# Patient Record
Sex: FEMALE | Race: WHITE | NOT HISPANIC OR LATINO | URBAN - METROPOLITAN AREA
[De-identification: names, ages, dates, MRNs, and addresses within clinical notes are randomized per-mention and may not be internally consistent; named-entity substitution may affect disease eponyms.]

---

## 2017-01-21 ENCOUNTER — TRANSFERRED RECORDS (OUTPATIENT)
Dept: HEALTH INFORMATION MANAGEMENT | Facility: CLINIC | Age: 82
End: 2017-01-21

## 2017-01-23 ENCOUNTER — HISTORIC RESULTS (OUTPATIENT)
Dept: ADMINISTRATIVE | Age: 82
End: 2017-01-23

## 2017-01-30 ENCOUNTER — HISTORIC RESULTS (OUTPATIENT)
Dept: ADMINISTRATIVE | Age: 82
End: 2017-01-30

## 2017-06-01 ENCOUNTER — TRANSFERRED RECORDS (OUTPATIENT)
Dept: HEALTH INFORMATION MANAGEMENT | Facility: CLINIC | Age: 82
End: 2017-06-01

## 2017-07-11 ENCOUNTER — OFFICE VISIT (OUTPATIENT)
Dept: OPHTHALMOLOGY | Facility: CLINIC | Age: 82
End: 2017-07-11
Attending: OPHTHALMOLOGY
Payer: MEDICARE

## 2017-07-11 DIAGNOSIS — H53.40 VISUAL FIELD DEFECT: Primary | ICD-10-CM

## 2017-07-11 PROCEDURE — 99214 OFFICE O/P EST MOD 30 MIN: CPT | Mod: ZF | Performed by: TECHNICIAN/TECHNOLOGIST

## 2017-07-11 RX ORDER — ACETAMINOPHEN 325 MG/1
325-650 TABLET ORAL EVERY 6 HOURS PRN
COMMUNITY

## 2017-07-11 RX ORDER — LEVETIRACETAM 500 MG/1
500 TABLET ORAL 2 TIMES DAILY
COMMUNITY

## 2017-07-11 RX ORDER — LEVOTHYROXINE SODIUM 100 UG/1
100 TABLET ORAL DAILY
COMMUNITY

## 2017-07-11 RX ORDER — SIMVASTATIN 20 MG
TABLET ORAL AT BEDTIME
COMMUNITY

## 2017-07-11 RX ORDER — MULTIVIT-MIN/IRON/FOLIC ACID/K 18-600-40
CAPSULE ORAL
COMMUNITY

## 2017-07-11 RX ORDER — ESCITALOPRAM OXALATE 5 MG/1
5 TABLET ORAL DAILY
COMMUNITY

## 2017-07-11 RX ORDER — METOPROLOL TARTRATE 50 MG
50 TABLET ORAL 2 TIMES DAILY
COMMUNITY

## 2017-07-11 ASSESSMENT — REFRACTION_WEARINGRX
OS_CYLINDER: SPHERE
OD_ADD: +3.00
OS_SPHERE: -0.50
OD_SPHERE: -0.75
OD_AXIS: 040
OS_ADD: +3.00
OD_CYLINDER: +0.25

## 2017-07-11 ASSESSMENT — VISUAL ACUITY
OD_CC: 20/25
OS_CC: 20/30
METHOD: SNELLEN - LINEAR
OS_CC: J1
OD_CC: J1
OS_CC+: +2
CORRECTION_TYPE: GLASSES
OD_CC+: +2

## 2017-07-11 ASSESSMENT — CUP TO DISC RATIO
OS_RATIO: 0.3
OD_RATIO: 0.25

## 2017-07-11 ASSESSMENT — CONF VISUAL FIELD
OD_INFERIOR_NASAL_RESTRICTION: 3
METHOD: COUNTING FINGERS
OD_SUPERIOR_NASAL_RESTRICTION: 3
OS_INFERIOR_TEMPORAL_RESTRICTION: 3
OS_SUPERIOR_TEMPORAL_RESTRICTION: 3

## 2017-07-11 ASSESSMENT — TONOMETRY
IOP_METHOD: ICARE
OD_IOP_MMHG: 14
OS_IOP_MMHG: 14

## 2017-07-11 ASSESSMENT — EXTERNAL EXAM - LEFT EYE: OS_EXAM: NORMAL

## 2017-07-11 ASSESSMENT — SLIT LAMP EXAM - LIDS
COMMENTS: NORMAL
COMMENTS: NORMAL

## 2017-07-11 ASSESSMENT — EXTERNAL EXAM - RIGHT EYE: OD_EXAM: NORMAL

## 2017-07-11 NOTE — MR AVS SNAPSHOT
After Visit Summary   2017    Eusebia Montenegro    MRN: [id number]           Patient Information     Date Of Birth          1935        Visit Information        Provider Department      2017 1:00 PM Xiang Davies MD [facility] Peds Eye General        Today's Diagnoses     Visual field defect    -  1       Follow-ups after your visit        Who to contact     Please call your clinic at [phone] to:    Ask questions about your health    Make or cancel appointments    Discuss your medicines    Learn about your test results    Speak to your doctor   If you have compliments or concerns about an experience at your clinic, or if you wish to file a complaint, please contact [facility] Patient Relations at [phone] or email us at [email or web address]         Additional Information About Your Visit        MyChart Information     [employer] is an electronic gateway that provides easy, online access to your medical records. With [employer], you can request a clinic appointment, read your test results, renew a prescription or communicate with your care team.     To sign up for [employer] visit the website at [email or web address]   You will be asked to enter the access code listed below, as well as some personal information. Please follow the directions to create your username and password.     Your access code is: [email or web address]  Expires: 10/17/2017  8:27 PM     Your access code will  in 90 days. If you need help or a new code, please contact your [facility] or call [phone] for assistance.        Care EveryWhere ID     This is your Care EveryWhere ID. This could be used by other organizations to access your [facility] medical records  [id number]         Blood Pressure from Last 3 Encounters:   No data found for BP    Weight from Last 3 Encounters:   No data found for Wt              Today, you had the following      No orders found for display       Primary Care Provider Office Phone # Fax #    Josephine Gonzales [phone]       [facility] [address]  [facility] [address]        Equal Access to Services     YARELY BOLES : Hadii srinath ku hadanujo Soomaali, waaxda luqadaha, qaybta kaalmada adeegyada, kartik rojasn breann starr laLorenethor plaza. So [facility] [phone].    ATENCIÓN: Si habla español, tiene a overton disposición servicios gratuitos de asistencia lingüística. Llame al [phone].    We comply with applicable federal civil rights laws and [place] laws. We do not discriminate on the basis of race, color, national origin, age, disability sex, sexual orientation or gender identity.            Thank you!     Thank you for choosing [facility]  for your care. Our goal is always to provide you with excellent care. Hearing back from our patients is one way we can continue to improve our services. Please take a few minutes to complete the written survey that you may receive in the mail after your visit with us. Thank you!             Your Updated Medication List - Protect others around you: Learn how to safely use, store and throw away your medicines at www.disposemymeds.org.          This list is accurate as of: 7/11/17 11:59 PM.  Always use your most recent med list.                   Brand Name Dispense Instructions for use Diagnosis    acetaminophen 325 MG tablet    TYLENOL     Take 325-650 mg by mouth every 6 hours as needed        Calcium 250 MG Caps           escitalopram 5 MG tablet    LEXAPRO     Take 5 mg by mouth daily        levETIRAcetam 500 MG tablet    KEPPRA     Take 500 mg by mouth 2 times daily        levothyroxine 100 MCG tablet    SYNTHROID/LEVOTHROID     Take 100 mcg by mouth daily        metoprolol 50 MG tablet    LOPRESSOR     Take 50 mg by mouth 2 times daily        MULTIVITAMIN ADULT PO           rivaroxaban ANTICOAGULANT 20 MG Tabs tablet    XARELTO     Take 20 mg by mouth  daily (with dinner)        simvastatin 20 MG tablet    ZOCOR     Take by mouth At Bedtime        Vitamin D (Cholecalciferol) 1000 UNITS Tabs

## 2017-07-11 NOTE — NURSING NOTE
Chief Complaints and History of Present Illnesses   Patient presents with     Other     h/o stroke 7/2016, with left sided weakness.  notes patient doesn't seem to use vision on left side, and hand eye coordination has decreased. [place] exam with [place] optometrist November 2016, was told vision is fine. Sees well distance and near with current glasses. No strab or AHP.

## 2017-07-11 NOTE — LETTER
2017    RE: Eusebia Montenegro  : 1935  MRN: [id number]    Dear Dr. Gonzales,    Thank you for referring your patient, Eusebia Montenegro, to my neuro-ophthalmology clinic recently.  After a thorough neuro-ophthalmic history and examination, I came to the following conclusions:     1. 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 Montenegro is an 82 year old female with past medical history significant for atrial fibrillation, hypertension and dyslipidemia referred by her primary care physician. She suffered an ischemic stroke in the right temporoparietal region with resultant left sided neglect in 2016 following right total knee arthroplasty surgery.  It was felt that the cause was likely that she was off her anticoagulation for the knee surgery and her A-fib led to the stroke. Mrs Montenegro does not think that she has any vision problems, however, her  believes that she is not able to read as much as before the stroke.  thinks that the stroke has impacted her short term memory significantly.    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  Her visual acuity was normal in both eyes.  She missed all Ishihara color plates in both eyes except for the control plate.  Efferent exam is only significant for small exophoria at near. There is symptomatic diplopia though at times.  Slit lamp exam was remarkable for bilateral pseuodophakia with mildly decentered intra-ocular lens in the left eye. 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  Cranial nerves were normal bilaterally.    Dynamic automated visual field was performed but was limited due to the patient inability to perform testing.    Although Mrs Montenegro does have dense congruous homonymous  hemianopia of the left visual field in both eyes, she is functioning visually relatively well. She definitely could benefit from adjustments to her home environment accordingly. She would benefit from low vision services to help her in this respect if she desires. I offered the patient to see our low vision occupational therapy here at [facility] but her  does not wish to travel to [place] for this.  He will look into low vision occupational therapy options closer to home. She does not need to be seen by Neuro-Ophthalmology service again unless a new problem arises.    Again, thank you for trusting me with the care of your patient.  For further exam details, please feel free to contact our office for additional records.  If you wish to contact me regarding this patient please email me at [email or web address] or give my clinic a call to arrange a phone conversation.    Sincerely,    Xiang Davies MD  , Neuro-Ophthalmology and Adult Strabismus  Department of Ophthalmology and Visual Neurosciences  [facility]    DX: homonymous hemianopia, stroke

## 2017-07-12 NOTE — PROGRESS NOTES
1. 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 Montenegro is an 82 year old female with past medical history significant for atrial fibrillation, hypertension and dyslipidemia referred by her primary care physician. She suffered an ischemic stroke in the right temporoparietal region with resultant left sided neglect in July 2016 following right total knee arthroplasty surgery.  It was felt that the cause was likely that she was off her anticoagulation for the knee surgery and her A-fib led to the stroke. Mrs Montenegro does not think that she has any vision problems, however, her  believes that she is not able to read as much as before the stroke.  thinks that the stroke has impacted her short term memory significantly.    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  Her visual acuity was normal in both eyes.  She missed all Ishihara color plates in both eyes except for the control plate.  Efferent exam is only significant for small exophoria at near. There is symptomatic diplopia though at times.  Slit lamp exam was remarkable for bilateral pseuodophakia with mildly decentered intra-ocular lens in the left eye. 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  Cranial nerves were normal bilaterally.    Dynamic automated visual field was performed but was limited due to the patient inability to perform testing.    Although Mrs Montenegro does have dense congruous homonymous hemianopia of the left visual field in both eyes, she is functioning visually relatively well. She definitely could benefit from adjustments to her home environment accordingly. She would benefit from low vision services to help her in this respect if she desires. I offered the patient to see  our low vision occupational therapy here at [facility] but her  does not wish to travel to [place] for this.  He will look into low vision occupational therapy options closer to home. She does not need to be seen by Neuro-Ophthalmology service again unless a new problem arises.     Complete documentation of historical and exam elements from today's encounter can be found in the full encounter summary report (not reduplicated in this progress note).  I personally obtained the chief complaint(s) and history of present illness.  I confirmed and edited as necessary the review of systems, past medical/surgical history, family history, social history, and examination findings as documented by others; and I examined the patient myself.  I personally reviewed the relevant tests, images, and reports as documented above.  I formulated and edited as necessary the assessment and plan and discussed the findings and management plan with the patient and family     Xiang Davies MD

## 2017-10-05 ENCOUNTER — HISTORIC RESULTS (OUTPATIENT)
Dept: ADMINISTRATIVE | Age: 82
End: 2017-10-05

## 2017-10-23 ENCOUNTER — HISTORIC RESULTS (OUTPATIENT)
Dept: ADMINISTRATIVE | Age: 82
End: 2017-10-23

## 2018-02-06 ENCOUNTER — DOCUMENTATION ONLY (OUTPATIENT)
Dept: OTHER | Facility: CLINIC | Age: 83
End: 2018-02-06

## 2018-02-06 PROBLEM — Z71.89 ACP (ADVANCE CARE PLANNING): Chronic | Status: ACTIVE | Noted: 2018-02-06

## 2019-10-21 ENCOUNTER — RECORDS - HEALTHEAST (OUTPATIENT)
Dept: LAB | Facility: CLINIC | Age: 84
End: 2019-10-21

## 2019-10-21 LAB
CALCIUM SERPL-MCNC: 9.9 MG/DL (ref 8.5–10.5)
CREAT SERPL-MCNC: 0.69 MG/DL (ref 0.6–1.1)
FOLATE SERPL-MCNC: 18.3 NG/ML
GFR SERPL CREATININE-BSD FRML MDRD: >60 ML/MIN/1.73M2
LEVETIRACETAM (KEPPRA): 18.1 UG/ML (ref 6–46)
PHOSPHATE SERPL-MCNC: 2.5 MG/DL (ref 2.5–4.5)
PTH-INTACT SERPL-MCNC: 84 PG/ML (ref 10–86)
TSH SERPL DL<=0.005 MIU/L-ACNC: 0.62 UIU/ML (ref 0.3–5)
VIT B12 SERPL-MCNC: 682 PG/ML (ref 213–816)

## 2020-07-15 ENCOUNTER — RECORDS - HEALTHEAST (OUTPATIENT)
Dept: LAB | Facility: CLINIC | Age: 85
End: 2020-07-15

## 2020-07-23 LAB
SARS-COV-2 BY NAA - HISTORICAL: NOT DETECTED
SARS-COV-2 SOURCE - HISTORICAL: NORMAL

## 2020-08-14 ENCOUNTER — RECORDS - HEALTHEAST (OUTPATIENT)
Dept: LAB | Facility: CLINIC | Age: 85
End: 2020-08-14

## 2020-08-20 LAB
SARS-COV-2 BY NAA - HISTORICAL: NOT DETECTED
SARS-COV-2 SOURCE - HISTORICAL: NORMAL